# Patient Record
Sex: MALE | Race: WHITE | NOT HISPANIC OR LATINO | Employment: OTHER | URBAN - METROPOLITAN AREA
[De-identification: names, ages, dates, MRNs, and addresses within clinical notes are randomized per-mention and may not be internally consistent; named-entity substitution may affect disease eponyms.]

---

## 2018-02-16 ENCOUNTER — ESTABLISHED COMPREHENSIVE EXAM (OUTPATIENT)
Dept: URBAN - METROPOLITAN AREA CLINIC 39 | Facility: CLINIC | Age: 73
End: 2018-02-16

## 2018-02-16 DIAGNOSIS — H04.123: ICD-10-CM

## 2018-02-16 DIAGNOSIS — H18.51: ICD-10-CM

## 2018-02-16 DIAGNOSIS — H43.813: ICD-10-CM

## 2018-02-16 PROCEDURE — 92014 COMPRE OPH EXAM EST PT 1/>: CPT

## 2018-02-16 PROCEDURE — 1036F TOBACCO NON-USER: CPT

## 2018-02-16 PROCEDURE — 92015 DETERMINE REFRACTIVE STATE: CPT

## 2018-02-16 PROCEDURE — G8756 NO BP MEASURE DOC: HCPCS

## 2018-02-16 PROCEDURE — G8427 DOCREV CUR MEDS BY ELIG CLIN: HCPCS

## 2018-02-16 ASSESSMENT — VISUAL ACUITY
OU_CC: 20/20
OD_SC: 20/20-2
OD_CC: 20/20
OU_SC: 20/20
OU_CC: J1+
OU_SC: J5
OS_SC: J6
OD_SC: J8
OD_CC: J1+
OS_SC: 20/20-2
OS_CC: J1+
OS_CC: 20/20

## 2018-02-16 ASSESSMENT — TONOMETRY
OS_IOP_MMHG: 13
OD_IOP_MMHG: 13

## 2019-06-17 NOTE — PATIENT DISCUSSION
PATIENT UNDERSTANDS THAT IF DOES PROCEEDS WITH STANDARD CATARACT SURGERY HE WILL NEED GLASSES FULL TIME.

## 2019-08-02 ENCOUNTER — ESTABLISHED COMPREHENSIVE EXAM (OUTPATIENT)
Dept: URBAN - METROPOLITAN AREA CLINIC 39 | Facility: CLINIC | Age: 74
End: 2019-08-02

## 2019-08-02 DIAGNOSIS — H04.123: ICD-10-CM

## 2019-08-02 DIAGNOSIS — H52.222: ICD-10-CM

## 2019-08-02 DIAGNOSIS — H18.51: ICD-10-CM

## 2019-08-02 DIAGNOSIS — H52.03: ICD-10-CM

## 2019-08-02 DIAGNOSIS — G43.109: ICD-10-CM

## 2019-08-02 DIAGNOSIS — H43.813: ICD-10-CM

## 2019-08-02 DIAGNOSIS — H52.4: ICD-10-CM

## 2019-08-02 PROCEDURE — 92015 DETERMINE REFRACTIVE STATE: CPT

## 2019-08-02 PROCEDURE — 92014 COMPRE OPH EXAM EST PT 1/>: CPT

## 2019-08-02 ASSESSMENT — VISUAL ACUITY
OU_CC: 20/20-2
OS_CC: J1
OU_CC: J1+
OD_SC: 20/20-1
OD_SC: J5
OU_SC: 20/20-2
OD_CC: 20/25-2
OD_CC: J1
OS_SC: 20/30-2
OS_SC: J6
OS_CC: 20/20-2

## 2019-08-02 ASSESSMENT — TONOMETRY
OS_IOP_MMHG: 14
OD_IOP_MMHG: 14

## 2020-12-17 ENCOUNTER — ESTABLISHED COMPREHENSIVE EXAM (OUTPATIENT)
Dept: URBAN - METROPOLITAN AREA CLINIC 39 | Facility: CLINIC | Age: 75
End: 2020-12-17

## 2020-12-17 DIAGNOSIS — G43.109: ICD-10-CM

## 2020-12-17 DIAGNOSIS — H18.513: ICD-10-CM

## 2020-12-17 DIAGNOSIS — H52.4: ICD-10-CM

## 2020-12-17 DIAGNOSIS — H04.123: ICD-10-CM

## 2020-12-17 DIAGNOSIS — H52.222: ICD-10-CM

## 2020-12-17 DIAGNOSIS — H52.03: ICD-10-CM

## 2020-12-17 DIAGNOSIS — H43.813: ICD-10-CM

## 2020-12-17 PROCEDURE — 92014 COMPRE OPH EXAM EST PT 1/>: CPT

## 2020-12-17 PROCEDURE — 92015 DETERMINE REFRACTIVE STATE: CPT

## 2020-12-17 ASSESSMENT — VISUAL ACUITY
OS_SC: 20/30
OD_CC: J3
OS_SC: J6
OS_CC: J3
OS_CC: 20/40-1
OD_SC: 20/25-1
OD_CC: 20/20-1
OD_SC: J5

## 2020-12-17 ASSESSMENT — TONOMETRY
OD_IOP_MMHG: 15
OS_IOP_MMHG: 12

## 2022-10-21 ENCOUNTER — COMPREHENSIVE EXAM (OUTPATIENT)
Dept: URBAN - METROPOLITAN AREA CLINIC 39 | Facility: CLINIC | Age: 77
End: 2022-10-21

## 2022-10-21 DIAGNOSIS — H04.123: ICD-10-CM

## 2022-10-21 DIAGNOSIS — G43.109: ICD-10-CM

## 2022-10-21 DIAGNOSIS — H52.4: ICD-10-CM

## 2022-10-21 DIAGNOSIS — H43.813: ICD-10-CM

## 2022-10-21 DIAGNOSIS — H52.03: ICD-10-CM

## 2022-10-21 DIAGNOSIS — H52.222: ICD-10-CM

## 2022-10-21 DIAGNOSIS — H18.513: ICD-10-CM

## 2022-10-21 PROCEDURE — 92014 COMPRE OPH EXAM EST PT 1/>: CPT

## 2022-10-21 PROCEDURE — 92015 DETERMINE REFRACTIVE STATE: CPT

## 2022-10-21 ASSESSMENT — VISUAL ACUITY
OD_CC: J5
OD_SC: J5
OS_SC: 20/30-1
OU_SC: 20/25-1
OD_SC: 20/30-2
OS_CC: 20/30+2
OU_CC: 20/25-1
OS_SC: J5
OU_CC: J5
OU_SC: J5
OD_CC: 20/30-1
OS_CC: J6

## 2022-10-21 ASSESSMENT — TONOMETRY
OD_IOP_MMHG: 16
OS_IOP_MMHG: 16

## 2022-10-28 ENCOUNTER — EMERGENCY VISIT (OUTPATIENT)
Dept: URBAN - METROPOLITAN AREA CLINIC 39 | Facility: CLINIC | Age: 77
End: 2022-10-28

## 2022-10-28 VITALS — SYSTOLIC BLOOD PRESSURE: 124 MMHG | HEART RATE: 63 BPM | HEIGHT: 60 IN | DIASTOLIC BLOOD PRESSURE: 70 MMHG

## 2022-10-28 DIAGNOSIS — H43.813: ICD-10-CM

## 2022-10-28 DIAGNOSIS — H35.362: ICD-10-CM

## 2022-10-28 DIAGNOSIS — H53.10: ICD-10-CM

## 2022-10-28 DIAGNOSIS — H04.123: ICD-10-CM

## 2022-10-28 PROCEDURE — 92014 COMPRE OPH EXAM EST PT 1/>: CPT

## 2022-10-28 PROCEDURE — 92250 FUNDUS PHOTOGRAPHY W/I&R: CPT

## 2022-10-28 ASSESSMENT — VISUAL ACUITY
OD_SC: 20/20
OU_CC: J1+
OU_SC: 20/20-1
OD_SC: J7
OU_CC: 20/20
OS_SC: 20/25
OS_CC: 20/20
OU_SC: J2
OD_CC: J1+
OS_SC: J5
OS_CC: J1+
OD_CC: 20/20